# Patient Record
(demographics unavailable — no encounter records)

---

## 2024-11-04 NOTE — PHYSICAL EXAM
[de-identified] : General appearance: well-nourished and hydrated, pleasant, alert and oriented x 3, cooperative. HEENT: Normocephalic, EOM intact, Nasal septum midline, Oral cavity clear, External auditory canal clear. Cardiovascular: no apparent abnormalities, no lower leg edema, no varicosities, pedal pulses are palpable. Lymphatics Lymph nodes: none palpated, Lymphedema: not present. Neurologic: sensation is normal, no muscle weakness in upper or lower extremities, patella tendon reflexes intact. Dermatologic: no apparent skin lesions, moist, warm, no rash. Spine: cervical spine appears normal and moves freely, thoracic spine appears normal and moves freely, lumbosacral spine appears normal and moves freely. Gait: nonantalgic.   Left Knee Inspection: no effusion or erythema. Wounds: well-healed midline incision. Alignment: normal. Palpation: no specific tenderness on palpation. ROM: Active (in degrees): 0-135 Ligamentous laxity (neg): all ligaments appear stable, negative ant. drawer test, negative post. drawer test, stable to varus stress test, stable to valgus stress test, negative Lachman's test, negative pivot shift test, Meniscal Test: negative McMurrays, negative Brandi. Patellofemoral Alignment Test: Q angle-, normal. Muscle Test: good quad strength. Leg examination: calf is soft and non-tender. [de-identified] : No x-rays were taken today.

## 2024-11-04 NOTE — HISTORY OF PRESENT ILLNESS
[de-identified] : FLY SCOTT 64 year  old male presents for follow-up evaluation of s/p left knee surgical arthroscopy on 10/1. Patient notes that he is doing excellent. Denies any associated symptoms and pain. Patient reports that he is back to training, and is playing tennis. Patient states that he stays very active, and his left knee does not bother him at all.

## 2024-11-04 NOTE — DISCUSSION/SUMMARY
[de-identified] : Discussed at length the nature of the patient's condition and reviewed non-operative and operative treatment. Patient is doing well following his left knee surgical arthroscopy. I reviewed operative reports with him today. In the interim, I advised him to continue with exercise and weight management.   I will see him back in 1 year.

## 2024-11-04 NOTE — ADDENDUM
[FreeTextEntry1] : This note was written by Tono Mejia on 11/04/2024 acting as scribe for Dr. Sebastian Franco M.D.   I, Dr. Sebastian Franco, have read and attest that all the information, medical decision making and discharge instructions within are true and accurate.